# Patient Record
Sex: MALE | Race: WHITE | NOT HISPANIC OR LATINO | Employment: OTHER | RURAL
[De-identification: names, ages, dates, MRNs, and addresses within clinical notes are randomized per-mention and may not be internally consistent; named-entity substitution may affect disease eponyms.]

---

## 2022-11-09 ENCOUNTER — HOSPITAL ENCOUNTER (EMERGENCY)
Facility: HOSPITAL | Age: 62
Discharge: HOME | End: 2022-11-09
Payer: COMMERCIAL

## 2022-11-09 VITALS
HEIGHT: 68 IN | SYSTOLIC BLOOD PRESSURE: 148 MMHG | TEMPERATURE: 99 F | HEART RATE: 59 BPM | DIASTOLIC BLOOD PRESSURE: 70 MMHG | RESPIRATION RATE: 18 BRPM | BODY MASS INDEX: 25.76 KG/M2 | WEIGHT: 170 LBS | OXYGEN SATURATION: 100 %

## 2022-11-09 DIAGNOSIS — S92.302A CLOSED DISPLACED FRACTURE OF METATARSAL BONE OF LEFT FOOT, UNSPECIFIED METATARSAL, INITIAL ENCOUNTER: ICD-10-CM

## 2022-11-09 DIAGNOSIS — M79.672 LEFT FOOT PAIN: ICD-10-CM

## 2022-11-09 DIAGNOSIS — M79.671 RIGHT FOOT PAIN: ICD-10-CM

## 2022-11-09 DIAGNOSIS — T14.90XA INJURY: ICD-10-CM

## 2022-11-09 DIAGNOSIS — S92.255A CLOSED NONDISPLACED FRACTURE OF NAVICULAR BONE OF LEFT FOOT, INITIAL ENCOUNTER: ICD-10-CM

## 2022-11-09 DIAGNOSIS — S92.242A DISPLACED FRACTURE OF MEDIAL CUNEIFORM OF LEFT FOOT, INITIAL ENCOUNTER FOR CLOSED FRACTURE: Primary | ICD-10-CM

## 2022-11-09 PROCEDURE — 99283 EMERGENCY DEPT VISIT LOW MDM: CPT | Mod: ,,, | Performed by: NURSE PRACTITIONER

## 2022-11-09 PROCEDURE — 29515 APPLICATION SHORT LEG SPLINT: CPT | Mod: LT

## 2022-11-09 PROCEDURE — 99284 EMERGENCY DEPT VISIT MOD MDM: CPT | Mod: 25

## 2022-11-09 PROCEDURE — 99283 PR EMERGENCY DEPT VISIT,LEVEL III: ICD-10-PCS | Mod: ,,, | Performed by: NURSE PRACTITIONER

## 2022-11-09 RX ORDER — OXYCODONE AND ACETAMINOPHEN 10; 325 MG/1; MG/1
1 TABLET ORAL EVERY 6 HOURS PRN
Qty: 12 TABLET | Refills: 0 | Status: SHIPPED | OUTPATIENT
Start: 2022-11-09

## 2022-11-09 RX ORDER — VALSARTAN 40 MG/1
TABLET ORAL DAILY
COMMUNITY

## 2022-11-09 RX ORDER — IBUPROFEN 800 MG/1
800 TABLET ORAL EVERY 8 HOURS PRN
Qty: 30 TABLET | Refills: 0 | Status: SHIPPED | OUTPATIENT
Start: 2022-11-09

## 2022-11-09 NOTE — DISCHARGE INSTRUCTIONS
Follow up with Dr Daniel at [facility] at the [facility] at 1:30 pm tomorrow, Thursday 11/10/22.   Do not get splint wet.  Keep leg elevated on pillow as much as possible.   Do not put weight of foot. Use crutches at all times when ambulating.  Percocet as directed for pain uncontrolled with Motrin.  Return to emergency department for any new or worsening symptoms.

## 2022-11-09 NOTE — ED PROVIDER NOTES
Encounter Date: 2022       History     Chief Complaint   Patient presents with    Foot Injury     left     Alejandro Hill is a 61 y.o. White /male presenting to ED with left foot pain after a hay fork fell onto his foot approx 2 hrs PTA. He has difficulty ambulating since injury due to pain. He took a dose of  Lortab from 2012 prescription and pain is controlled on arrival. He has a contusion and small superficial abrasion to dorsum of left foot. Neurovascularly intact. Currently in NAD. VSS at this time.        The history is provided by the patient.   Foot Injury   The incident occurred at home. The injury mechanism was a direct blow. The incident occurred 1 to 2 hours ago. The pain is present in the left foot. The quality of the pain is described as aching. The pain has been Intermittent since onset. Associated symptoms include inability to bear weight. Pertinent negatives include no numbness, no loss of motion, no muscle weakness, no loss of sensation and no tingling. He reports no foreign bodies present. The symptoms are aggravated by activity, bearing weight and palpation. He has tried ice and acetaminophen for the symptoms. The treatment provided significant relief.   Review of patient's allergies indicates:   Allergen Reactions    Penicillins Anaphylaxis     Past Medical History:   Diagnosis Date    Hypertension      Past Surgical History:   Procedure Laterality Date    NOSE SURGERY       History reviewed. No pertinent family history.  Social History     Tobacco Use    Smoking status: Never    Smokeless tobacco: Never   Substance Use Topics    Alcohol use: Yes     Alcohol/week: 2.0 standard drinks     Types: 2 Shots of liquor per week     Comment: twice a week    Drug use: Never     Review of Systems   Constitutional:  Positive for activity change.   Musculoskeletal:  Positive for arthralgias, gait problem, joint swelling and myalgias.   Neurological:  Negative for tingling, weakness and  numbness.   All other systems reviewed and are negative.    Physical Exam     Initial Vitals [11/09/22 1215]   BP Pulse Resp Temp SpO2   (!) 162/78 70 16 98.5 °F (36.9 °C) 100 %      MAP       --         Physical Exam    Nursing note and vitals reviewed.  Constitutional: He appears well-developed and well-nourished. No distress.   Cardiovascular:  Normal rate.           Pulmonary/Chest: No respiratory distress.   Musculoskeletal:         General: Tenderness and edema present.      Left foot: Decreased range of motion. Normal capillary refill. Swelling, tenderness and bony tenderness present. No crepitus. Normal pulse.        Legs:      Neurological: He is alert. He has normal strength. GCS score is 15. GCS eye subscore is 4. GCS verbal subscore is 5. GCS motor subscore is 6.   Skin: Skin is warm and dry. Capillary refill takes less than 2 seconds.       Medical Screening Exam   See Full Note    ED Course   Procedures  Labs Reviewed - No data to display       Imaging Results              CT Foot Without Contrast Left (Final result)  Result time 11/09/22 14:16:50      Final result by Harman Kelley II, MD (11/09/22 14:16:50)                   Impression:      Fractures of the 1st 2nd 3rd and 4th metatarsals distally similar to x-ray appearance.  There is fracture of the proximal 1st, 2nd and 3rd metatarsals.  There are fractures of the 1st 2nd and 3rd cuneiforms and navicular bone.      Electronically signed by: Harman Kelley  Date:    11/09/2022  Time:    14:16               Narrative:    EXAMINATION:  CT FOOT WITHOUT CONTRAST LEFT    CLINICAL HISTORY:  Foot trauma, occult fracture suspected, xray equivocal (Age >= 6y);    TECHNIQUE:  Axial CT imaging of the left foot is performed without contrast.  Computer reformatting is viewed in the sagittal and coronal plane.    CT dose reduction technique used - Dose Rite and tube current modulation.    COMPARISON:  X-ray 9 November 2022    FINDINGS:  Multiple fractures  present in the 1st 2nd 3rd and 4th metatarsals distally.  There is fracture of the proximal 1st metatarsal on the medial side.  There is fracture of the 1st cuneiform with slight distraction of the fragments.  There is fracture of the proximal 2nd metatarsal inferolateral  aspect with slight displacement.  There is fracture of the 2nd cuneiform superolateral aspect with slight displacement of the fragments there is fracture of the 3rd cuneiform with slight distraction of the fragments.  There is fracture of the inferior aspect of the navicular bone with slight displacement.    Remaining alignment appears within normal limits.    Muscles, tendons and ligaments appear within normal limits for CT.    No abnormal fluid collection or abnormal soft tissue density is identified.    No other abnormalities are identified.                                       X-Ray Foot Complete Left (Final result)  Result time 11/09/22 12:53:15   Procedure changed from X-Ray Foot Complete Right     Final result by Harman Kelley II, MD (11/09/22 12:53:15)                   Impression:      Multiple foot fractures as described above.      Electronically signed by: Harman Kelley  Date:    11/09/2022  Time:    12:53               Narrative:    EXAMINATION:  XR FOOT COMPLETE 3 VIEW LEFT    CLINICAL HISTORY:  left foot pain; Injury, unspecified, initial encounter    COMPARISON:  None available    FINDINGS:  Fractures of the distal 1st 2nd 3rd and 4th metatarsals present with displacement most notably in the 2nd and 3rd metatarsal fractures.  There is also fragments adjacent to the proximal 1st metatarsal, detail is limited.  Irregularity of the 1st cuneiform could also indicate fracture.  The alignment of the joints appears normal.  No degenerative change is present.  No soft tissue abnormality is seen.                                       Medications - No data to display              ED Course as of 11/09/22 1428   Wed Nov 09, 2022   7200  Case discussed with Dr Llanos, Orthopedics. He suggests CT scan, splint and referral to foot and ankle specialist. Injury will require surgery but notes that a foot and ankle specialist will need to perform. [LP]   1350 Patient requesting referral to [facility] for follow up care.  [LP]   1358 Contacted [facility] for referral. Patient demographics given. Dr. Mcdonald will see him in the [facility] tomorrow at 1:30. Requested that patient bring images on disc and that records be faxed to [phone]. Call ended at 1411. [LP]   1427 Patient has been instructed to discard old opioid prescription by returning to pharmacy. Discussed results. Patient is in agreement with plan to d/c home and f/u tomorrow. He verbalizes that he will discard old opioid and proper use of newly prescribed Percocet. V/u of all discharge instructions. No questions or concerns at this time.  [LP]      ED Course User Index  [LP] LARA Valencia          Clinical Impression:   Final diagnoses:  [T14.90XA] Injury  [M79.671] Right foot pain  [S92.302A] Closed displaced fracture of metatarsal bone of left foot, unspecified metatarsal, initial encounter - 1st-4th metatarsal fracture  [S92.255A] Closed nondisplaced fracture of navicular bone of left foot, initial encounter  [S92.242A] Displaced fracture of medial cuneiform of left foot, initial encounter for closed fracture (Primary)        ED Disposition Condition    Discharge Stable          ED Prescriptions       Medication Sig Dispense Start Date End Date Auth. Provider    oxyCODONE-acetaminophen (PERCOCET)  mg per tablet Take 1 tablet by mouth every 6 (six) hours as needed for Pain. 12 tablet 11/9/2022 -- LARA Valencia    ibuprofen (ADVIL,MOTRIN) 800 MG tablet Take 1 tablet (800 mg total) by mouth every 8 (eight) hours as needed for Pain. 30 tablet 11/9/2022 -- LARA Valencia          Follow-up Information    None          LARA Valencia  11/09/22  1327       LARA Valencia  11/09/22 1411       LARA Valencia  11/09/22 1425

## 2022-11-09 NOTE — ED NOTES
Posterior splint applied per GLENIS Mendoza rn ,neurovascular intact. GISSELLE Nayak np approved splint.pt was advised on how to check circulation .

## 2022-11-09 NOTE — ED TRIAGE NOTES
Presents to ed with brother per pov c/o  metal hay fork on tractor falling down on left foot. Foot swollen with small abrasion noted on top .neurovascular intact. Reported he took a Lortab 7.5 mg approx. 1 hour pta in ed.rates pain 5 on pain scale.ice applied per pt.